# Patient Record
Sex: MALE | Race: WHITE | NOT HISPANIC OR LATINO | Employment: FULL TIME | URBAN - METROPOLITAN AREA
[De-identification: names, ages, dates, MRNs, and addresses within clinical notes are randomized per-mention and may not be internally consistent; named-entity substitution may affect disease eponyms.]

---

## 2023-12-05 ENCOUNTER — OFFICE VISIT (OUTPATIENT)
Dept: GASTROENTEROLOGY | Facility: CLINIC | Age: 43
End: 2023-12-05
Payer: COMMERCIAL

## 2023-12-05 ENCOUNTER — TELEPHONE (OUTPATIENT)
Dept: GASTROENTEROLOGY | Facility: CLINIC | Age: 43
End: 2023-12-05

## 2023-12-05 ENCOUNTER — TELEPHONE (OUTPATIENT)
Dept: GASTROENTEROLOGY | Facility: CLINIC | Age: 43
End: 2023-12-05
Payer: COMMERCIAL

## 2023-12-05 VITALS
SYSTOLIC BLOOD PRESSURE: 124 MMHG | DIASTOLIC BLOOD PRESSURE: 82 MMHG | HEART RATE: 67 BPM | WEIGHT: 161 LBS | TEMPERATURE: 96.8 F | HEIGHT: 68 IN | BODY MASS INDEX: 24.4 KG/M2

## 2023-12-05 DIAGNOSIS — R10.13 EPIGASTRIC PAIN: Primary | ICD-10-CM

## 2023-12-05 DIAGNOSIS — K21.9 GASTROESOPHAGEAL REFLUX DISEASE, UNSPECIFIED WHETHER ESOPHAGITIS PRESENT: ICD-10-CM

## 2023-12-05 DIAGNOSIS — R10.11 RIGHT UPPER QUADRANT ABDOMINAL PAIN: ICD-10-CM

## 2023-12-05 PROCEDURE — 99203 OFFICE O/P NEW LOW 30 MIN: CPT | Performed by: INTERNAL MEDICINE

## 2023-12-05 RX ORDER — PROPRANOLOL HYDROCHLORIDE 10 MG/1
10 TABLET ORAL EVERY EVENING
COMMUNITY
Start: 2023-09-07

## 2023-12-05 RX ORDER — OMEPRAZOLE 20 MG/1
20 CAPSULE, DELAYED RELEASE ORAL
COMMUNITY
Start: 2023-11-20 | End: 2023-12-05 | Stop reason: ALTCHOICE

## 2023-12-05 RX ORDER — PANTOPRAZOLE SODIUM 40 MG/1
40 TABLET, DELAYED RELEASE ORAL DAILY
Qty: 90 TABLET | Refills: 3 | Status: SHIPPED | OUTPATIENT
Start: 2023-12-05

## 2023-12-05 NOTE — TELEPHONE ENCOUNTER
SANTANA BARAHONA PT SCHEDULED 01/17/2024 @1030.D PEREJACQUI PACKET HANDED TO [facility] FOR HUB TO READ

## 2023-12-05 NOTE — PROGRESS NOTES
Chief Complaint   Patient presents with   • Heartburn     Shayne Bess is a 43 y.o. male who presents with a history of of new onset epigastric pain right upper quadrant  HPI    Patient is a 43-year-old male with history of GERD presenting with epigastric and right upper quadrant pain acutely in early November.  Patient seen in emergency room with CT and labs performed all within normal limits presenting now for further recommendations.  Patient is alternated in the past between Nexium and omeprazole for his heartburn which is improved but still gets breakthrough.  Patient thinks the omeprazole is somewhat less effective than the Nexium.  Patient was actually on the omeprazole when the acute exacerbation occurred.  Patient here for further recommendations    Past Medical History:   Diagnosis Date   • GERD (gastroesophageal reflux disease) 01/2010    Intermittent heartburn managed by occassional PPI       Current Outpatient Medications:   •  propranolol (INDERAL) 10 MG tablet, Take 1 tablet by mouth Every Evening., Disp: , Rfl:   •  pantoprazole (PROTONIX) 40 MG EC tablet, Take 1 tablet by mouth Daily., Disp: 90 tablet, Rfl: 3  No Known Allergies  Social History     Socioeconomic History   • Marital status:    Tobacco Use   • Smoking status: Never   Substance and Sexual Activity   • Alcohol use: Yes     Alcohol/week: 5.0 standard drinks of alcohol     Types: 2 Cans of beer, 3 Shots of liquor per week     Comment: Currently not drinking alcohol, previous 3-5 days 1 serving   • Drug use: Never   • Sexual activity: Yes     Partners: Female     Birth control/protection: Natural family planning/Rhythm     History reviewed. No pertinent family history.  Review of Systems   Constitutional: Negative.    HENT: Negative.     Eyes: Negative.    Respiratory: Negative.     Cardiovascular: Negative.    Gastrointestinal:  Positive for abdominal pain. Negative for abdominal distention, anal bleeding, blood in stool,  constipation, diarrhea, nausea, rectal pain and vomiting.   Endocrine: Negative.    Musculoskeletal: Negative.    Skin: Negative.    Allergic/Immunologic: Negative.    Hematological: Negative.    Vitals:    12/05/23 1125   BP: 124/82   Pulse: 67   Temp: 96.8 °F (36 °C)     Physical Exam  Vitals and nursing note reviewed.   Constitutional:       Appearance: Normal appearance. He is well-developed and normal weight.   HENT:      Head: Normocephalic and atraumatic.   Eyes:      General: No scleral icterus.     Conjunctiva/sclera: Conjunctivae normal.   Neck:      Trachea: No tracheal deviation.   Cardiovascular:      Rate and Rhythm: Normal rate and regular rhythm.      Heart sounds: Normal heart sounds. No murmur heard.     No friction rub. No gallop.   Pulmonary:      Effort: Pulmonary effort is normal. No respiratory distress.      Breath sounds: Normal breath sounds.   Abdominal:      General: Bowel sounds are normal. There is no distension.      Palpations: Abdomen is soft. There is no mass.      Tenderness: There is no abdominal tenderness. There is no guarding or rebound.   Musculoskeletal:         General: No swelling or tenderness. Normal range of motion.      Cervical back: Normal range of motion and neck supple. No rigidity.   Skin:     General: Skin is warm and dry.      Coloration: Skin is not jaundiced.      Findings: No rash.   Neurological:      General: No focal deficit present.      Mental Status: He is alert and oriented to person, place, and time.      Cranial Nerves: No cranial nerve deficit.   Psychiatric:         Behavior: Behavior normal.         Thought Content: Thought content normal.         Judgment: Judgment normal.   Diagnoses and all orders for this visit:    Epigastric pain  -     US Liver; Future  -     Case Request; Standing  -     Implement Anesthesia orders day of procedure.; Standing  -     Obtain informed consent; Standing  -     Case Request    Right upper quadrant abdominal  pain  -     US Liver; Future  -     Case Request; Standing  -     Implement Anesthesia orders day of procedure.; Standing  -     Obtain informed consent; Standing  -     Case Request    Gastroesophageal reflux disease, unspecified whether esophagitis present  -     Case Request; Standing  -     Implement Anesthesia orders day of procedure.; Standing  -     Obtain informed consent; Standing  -     Case Request    Other orders  -     Discontinue: omeprazole (priLOSEC) 20 MG capsule; Take 1 capsule by mouth.  -     propranolol (INDERAL) 10 MG tablet; Take 1 tablet by mouth Every Evening.  -     pantoprazole (PROTONIX) 40 MG EC tablet; Take 1 tablet by mouth Daily.    Patient is a 43-year-old male with history of GERD heartburn taking intermittent Nexium or omeprazole with severe exacerbation noted early November.  Patient with severe epigastric and right upper quadrant pain radiating to the chest not responsive to his usual medicines.  Patient seen in the ER normal labs and normal CT referred now for further recommendations.  Patient without any hematemesis or melena.  CT with no gallbladder inflammation or CT changes of the GI tract.  For now patient does associate symptoms with fatty foods we will arrange for right upper quadrant ultrasound to rule out noncalcified stones but also arrange EGD due to history of reflux and symptoms similar to his reflux pain just more severe with this episode.  Will also try to change his PPI to see if we get better response from an alternative.

## 2023-12-05 NOTE — TELEPHONE ENCOUNTER
Returned patient's phone call. Advised he is to continue on pantoprazole and when he has healed more then he will decrease the pantoprazole and start Pepcid. He verb understanding.

## 2023-12-05 NOTE — TELEPHONE ENCOUNTER
Hub staff attempted to follow warm transfer process and was unsuccessful     Caller: Shayne Bess    Relationship to patient: Self    Best call back number: [phone] (Mobile)    Patient is needing: PT SAID AT TODAYS VISIT WITH DR NAJERA HE WAS TOLD HE WOULD BE PUT ON PEPCID, BUT IT LOOKS LIKE PANTOPRAZOLE WAS SENT TO HIS PHARMACY. HE WOULD LIKE A NURSE TO CALL HIM ASAP.

## 2023-12-18 ENCOUNTER — HOSPITAL ENCOUNTER (OUTPATIENT)
Dept: ULTRASOUND IMAGING | Facility: HOSPITAL | Age: 43
Discharge: HOME | End: 2023-12-18
Admitting: INTERNAL MEDICINE
Payer: COMMERCIAL

## 2023-12-18 DIAGNOSIS — R10.11 RIGHT UPPER QUADRANT ABDOMINAL PAIN: ICD-10-CM

## 2023-12-18 DIAGNOSIS — R10.13 EPIGASTRIC PAIN: ICD-10-CM

## 2023-12-18 PROCEDURE — 76705 ECHO EXAM OF ABDOMEN: CPT

## 2023-12-22 ENCOUNTER — TELEPHONE (OUTPATIENT)
Dept: GASTROENTEROLOGY | Facility: CLINIC | Age: 43
End: 2023-12-22
Payer: COMMERCIAL

## 2023-12-22 NOTE — TELEPHONE ENCOUNTER
Call to patient per Dr. Hedrick's results and recommendations.   Patient verbalized understanding     Symptoms have gotten better and the burning has subsided significantly. Patient wants to know when he can start having coffee and adding things back in his diet.     ----- Message from Aman Hedrick MD sent at 12/22/2023  8:40 AM EST -----  Ultrasound was normal, check on patient's symptoms.

## 2023-12-26 NOTE — TELEPHONE ENCOUNTER
Aman Hedrick MD  [facility] Clinical 2 Pool  Caller: Unspecified (4 days ago, 10:31 AM)  Okay to try and 1 thing back at a time, stay on the pantoprazole and will follow clinical response

## 2024-01-10 ENCOUNTER — TELEPHONE (OUTPATIENT)
Dept: GASTROENTEROLOGY | Facility: CLINIC | Age: 44
End: 2024-01-10
Payer: COMMERCIAL

## 2024-01-17 ENCOUNTER — ANESTHESIA (OUTPATIENT)
Dept: GASTROENTEROLOGY | Facility: HOSPITAL | Age: 44
End: 2024-01-17
Payer: COMMERCIAL

## 2024-01-17 ENCOUNTER — ANESTHESIA EVENT (OUTPATIENT)
Dept: GASTROENTEROLOGY | Facility: HOSPITAL | Age: 44
End: 2024-01-17
Payer: COMMERCIAL

## 2024-01-17 ENCOUNTER — HOSPITAL ENCOUNTER (OUTPATIENT)
Facility: HOSPITAL | Age: 44
Setting detail: HOSPITAL OUTPATIENT SURGERY
Discharge: HOME | End: 2024-01-17
Attending: INTERNAL MEDICINE | Admitting: INTERNAL MEDICINE
Payer: COMMERCIAL

## 2024-01-17 VITALS
HEIGHT: 68 IN | SYSTOLIC BLOOD PRESSURE: 134 MMHG | DIASTOLIC BLOOD PRESSURE: 83 MMHG | BODY MASS INDEX: 25.31 KG/M2 | WEIGHT: 167 LBS | HEART RATE: 68 BPM | OXYGEN SATURATION: 99 % | RESPIRATION RATE: 18 BRPM

## 2024-01-17 DIAGNOSIS — R10.11 RIGHT UPPER QUADRANT ABDOMINAL PAIN: ICD-10-CM

## 2024-01-17 DIAGNOSIS — K21.9 GASTROESOPHAGEAL REFLUX DISEASE, UNSPECIFIED WHETHER ESOPHAGITIS PRESENT: ICD-10-CM

## 2024-01-17 DIAGNOSIS — R10.13 EPIGASTRIC PAIN: ICD-10-CM

## 2024-01-17 PROCEDURE — 25010000002 PROPOFOL 10 MG/ML EMULSION: Performed by: REGISTERED NURSE

## 2024-01-17 PROCEDURE — 43239 EGD BIOPSY SINGLE/MULTIPLE: CPT | Performed by: INTERNAL MEDICINE

## 2024-01-17 PROCEDURE — 25810000003 LACTATED RINGERS PER 1000 ML: Performed by: INTERNAL MEDICINE

## 2024-01-17 PROCEDURE — S0260 H&P FOR SURGERY: HCPCS | Performed by: INTERNAL MEDICINE

## 2024-01-17 PROCEDURE — 88305 TISSUE EXAM BY PATHOLOGIST: CPT | Performed by: INTERNAL MEDICINE

## 2024-01-17 RX ORDER — LIDOCAINE HYDROCHLORIDE 20 MG/ML
INJECTION, SOLUTION INFILTRATION; PERINEURAL AS NEEDED
Status: DISCONTINUED | OUTPATIENT
Start: 2024-01-17 | End: 2024-01-17 | Stop reason: SURG

## 2024-01-17 RX ORDER — PROPOFOL 10 MG/ML
VIAL (ML) INTRAVENOUS AS NEEDED
Status: DISCONTINUED | OUTPATIENT
Start: 2024-01-17 | End: 2024-01-17 | Stop reason: SURG

## 2024-01-17 RX ORDER — SODIUM CHLORIDE 0.9 % (FLUSH) 0.9 %
10 SYRINGE (ML) INJECTION EVERY 12 HOURS SCHEDULED
Status: DISCONTINUED | OUTPATIENT
Start: 2024-01-17 | End: 2024-01-17 | Stop reason: HOSPADM

## 2024-01-17 RX ORDER — SODIUM CHLORIDE, SODIUM LACTATE, POTASSIUM CHLORIDE, CALCIUM CHLORIDE 600; 310; 30; 20 MG/100ML; MG/100ML; MG/100ML; MG/100ML
30 INJECTION, SOLUTION INTRAVENOUS CONTINUOUS PRN
Status: DISCONTINUED | OUTPATIENT
Start: 2024-01-17 | End: 2024-01-17 | Stop reason: HOSPADM

## 2024-01-17 RX ADMIN — PROPOFOL 80 MG: 10 INJECTION, EMULSION INTRAVENOUS at 13:21

## 2024-01-17 RX ADMIN — SODIUM CHLORIDE, POTASSIUM CHLORIDE, SODIUM LACTATE AND CALCIUM CHLORIDE 30 ML/HR: 600; 310; 30; 20 INJECTION, SOLUTION INTRAVENOUS at 12:17

## 2024-01-17 RX ADMIN — PROPOFOL 180 MCG/KG/MIN: 10 INJECTION, EMULSION INTRAVENOUS at 13:21

## 2024-01-17 RX ADMIN — LIDOCAINE HYDROCHLORIDE 60 MG: 20 INJECTION, SOLUTION INFILTRATION; PERINEURAL at 13:21

## 2024-01-17 NOTE — BRIEF OP NOTE
ESOPHAGOGASTRODUODENOSCOPY  Progress Note    Shayne Bess  1/17/2024    Pre-op Diagnosis:   Epigastric pain [R10.13]  Right upper quadrant abdominal pain [R10.11]  Gastroesophageal reflux disease, unspecified whether esophagitis present [K21.9]       Post-Op Diagnosis Codes:     * Epigastric pain [R10.13]     * Right upper quadrant abdominal pain [R10.11]     * Gastroesophageal reflux disease, unspecified whether esophagitis present [K21.9]     * Hiatal hernia [K44.9]     * Gastritis [K29.70]    Procedure/CPT® Codes:        Procedure(s):  ESOPHAGOGASTRODUODENOSCOPY with bx              Surgeon(s):  Aman Hedrick MD    Anesthesia: Monitored Anesthesia Care    Staff:   Endo Technician: Angelo Maldonado  Endo Nurse: Sydney Rincon RN         Estimated Blood Loss: minimal    Urine Voided: * No values recorded between 1/17/2024  1:11 PM and 1/17/2024  1:33 PM *    Specimens:                Specimens       ID Source Type Tests Collected By Collected At Frozen?    A Stomach Tissue TISSUE PATHOLOGY EXAM   Aman Hedrick MD 1/17/24 1331 No    Description: gastric bx    This specimen was not marked as sent.                  Drains: * No LDAs found *    Findings: EGD with biopsy performed showing normal duodenal mucosa throughout.  Antral gastritis biopsies taken.  Retroflexed view the GE junction did show small sliding hiatal hernia with normal esophageal mucosa throughout.        Complications: None          Aman Hedrick MD     Date: 1/17/2024  Time: 13:33 EST

## 2024-01-17 NOTE — H&P
"[facility], [place], operated by [facility]  Pre Procedure History & Physical    Chief Complaint:   Abdominal pain and reflux    Subjective     HPI:   Patient 43-year-old male with history of GERD presenting with epigastric pain here for EGD.    Past Medical History:   Past Medical History:   Diagnosis Date    GERD (gastroesophageal reflux disease) 01/2010    Intermittent heartburn managed by occassional PPI    Palpitations     DUE TO ANXIETY       Past Surgical History:  Past Surgical History:   Procedure Laterality Date    RHINOPLASTY         Family History:  Family History   Problem Relation Age of Onset    Malig Hyperthermia Neg Hx        Social History:   reports that he has never smoked. He does not have any smokeless tobacco history on file. He reports current alcohol use of about 5.0 standard drinks of alcohol per week. He reports that he does not use drugs.    Medications:   Medications Prior to Admission   Medication Sig Dispense Refill Last Dose    pantoprazole (PROTONIX) 40 MG EC tablet Take 1 tablet by mouth Daily. 90 tablet 3 1/16/2024    propranolol (INDERAL) 10 MG tablet Take 1 tablet by mouth At Night As Needed.   Past Week       Allergies:  Patient has no known allergies.    ROS:    Pertinent items are noted in HPI     Objective     Blood pressure 144/85, pulse 68, resp. rate 16, height 172.7 cm (68\"), weight 75.8 kg (167 lb), SpO2 99%.    Physical Exam   Constitutional: Pt is oriented to person, place, and time and well-developed, well-nourished, and in no distress.   Mouth/Throat: Oropharynx is clear and moist.   Neck: Normal range of motion.   Cardiovascular: Normal rate, regular rhythm and normal heart sounds.    Pulmonary/Chest: Effort normal and breath sounds normal.   Abdominal: Soft. Nontender  Skin: Skin is warm and dry.   Psychiatric: Mood, memory, affect and judgment normal.     Assessment & Plan     Diagnosis:  GERD  Epigastric pain    Anticipated Surgical Procedure:  EGD    The risks, " benefits, and alternatives of this procedure have been discussed with the patient or the responsible party- the patient understands and agrees to proceed.

## 2024-01-17 NOTE — ANESTHESIA PREPROCEDURE EVALUATION
Anesthesia Evaluation     Patient summary reviewed and Nursing notes reviewed                Airway   Mallampati: III  TM distance: <3 FB  Neck ROM: full  Narrow palate  Dental      Pulmonary - negative pulmonary ROS   Cardiovascular - negative cardio ROS    Rhythm: regular  Rate: normal        Neuro/Psych- negative ROS  GI/Hepatic/Renal/Endo    (+) GERD    Musculoskeletal (-) negative ROS    Abdominal    Substance History   (+) alcohol use     OB/GYN negative ob/gyn ROS         Other                    Anesthesia Plan    ASA 2     MAC     intravenous induction     Anesthetic plan, risks, benefits, and alternatives have been provided, discussed and informed consent has been obtained with: patient.    Plan discussed with CRNA.    CODE STATUS:

## 2024-01-17 NOTE — DISCHARGE INSTRUCTIONS
For the next 24 hours patient needs to be with a responsible adult.    For 24 hours DO NOT drive, operate machinery, appliances, drink alcohol, make important decisions or sign legal documents.    Start with a light or bland diet if you are feeling sick to your stomach otherwise advance to regular diet as tolerated.    Follow recommendations on procedure report if provided by your doctor.    Call Dr Hedrick for problems [phone]    Problems may include but not limited to: large amounts of bleeding, trouble breathing, repeated vomiting, severe unrelieved pain, fever or chills.

## 2024-01-17 NOTE — ANESTHESIA POSTPROCEDURE EVALUATION
Patient: Shayne Bess    Procedure Summary       Date: 01/17/24 Room / Location:  [facility] /  [facility]    Anesthesia Start: 1311 Anesthesia Stop: 1336    Procedure: ESOPHAGOGASTRODUODENOSCOPY with bx (Esophagus) Diagnosis:       Epigastric pain      Right upper quadrant abdominal pain      Gastroesophageal reflux disease, unspecified whether esophagitis present      Hiatal hernia      Gastritis      (Epigastric pain [R10.13])      (Right upper quadrant abdominal pain [R10.11])      (Gastroesophageal reflux disease, unspecified whether esophagitis present [K21.9])    Surgeons: Aman Hedrick MD Provider: Tawanda Cody MD    Anesthesia Type: MAC ASA Status: 2            Anesthesia Type: MAC    Vitals  Vitals Value Taken Time   /83 01/17/24 1356   Temp     Pulse 69 01/17/24 1401   Resp 18 01/17/24 1355   SpO2 99 % 01/17/24 1401   Vitals shown include unfiled device data.        Post Anesthesia Care and Evaluation    Patient location during evaluation: PACU  Patient participation: complete - patient participated  Level of consciousness: awake and alert  Pain management: adequate    Airway patency: patent  Anesthetic complications: No anesthetic complications    Cardiovascular status: acceptable  Respiratory status: acceptable  Hydration status: acceptable    Comments: --------------------            01/17/24               1355     --------------------   BP:       134/83     Pulse:               Resp:       18       SpO2:               --------------------VSS

## 2024-01-18 LAB
LAB AP CASE REPORT: NORMAL
PATH REPORT.FINAL DX SPEC: NORMAL
PATH REPORT.GROSS SPEC: NORMAL

## 2024-01-31 ENCOUNTER — TELEPHONE (OUTPATIENT)
Dept: GASTROENTEROLOGY | Facility: CLINIC | Age: 44
End: 2024-01-31
Payer: COMMERCIAL

## 2024-01-31 NOTE — TELEPHONE ENCOUNTER
Patient called. Advised as per Dr. Hedrick's note. He verb understanding.  He states he is feeling better.   Follow up scheduled with Gayle on 3/29@0845.   Patient is wanting to have a conversation to decrease his Pantoprazole.

## 2024-01-31 NOTE — TELEPHONE ENCOUNTER
----- Message from Aman Hedrick MD sent at 1/31/2024  1:13 PM EST -----  Mild gastritis seen but no H. pylori.  Check on patient's symptoms

## 2024-03-20 ENCOUNTER — OFFICE VISIT (OUTPATIENT)
Dept: GASTROENTEROLOGY | Facility: CLINIC | Age: 44
End: 2024-03-20
Payer: COMMERCIAL

## 2024-03-20 VITALS
DIASTOLIC BLOOD PRESSURE: 85 MMHG | HEIGHT: 68 IN | HEART RATE: 76 BPM | WEIGHT: 173.2 LBS | TEMPERATURE: 97.1 F | BODY MASS INDEX: 26.25 KG/M2 | SYSTOLIC BLOOD PRESSURE: 130 MMHG

## 2024-03-20 DIAGNOSIS — K21.9 GASTROESOPHAGEAL REFLUX DISEASE, UNSPECIFIED WHETHER ESOPHAGITIS PRESENT: Primary | ICD-10-CM

## 2024-03-20 PROCEDURE — 99213 OFFICE O/P EST LOW 20 MIN: CPT | Performed by: NURSE PRACTITIONER

## 2024-03-20 RX ORDER — PANTOPRAZOLE SODIUM 20 MG/1
20 TABLET, DELAYED RELEASE ORAL DAILY
Qty: 90 TABLET | Refills: 3 | Status: SHIPPED | OUTPATIENT
Start: 2024-03-20

## 2024-03-20 NOTE — PROGRESS NOTES
Chief Complaint   Patient presents with    Abdominal Pain       HPI    Shayne Bess is a  43 y.o. male here with a history of anxiety for a follow up visit for abdominal pain endoscopic follow-up.    This patient follows with Dr. Hedrick, new to me.    EGD dated 1/2024 - gastritis and small hiatal hernia.  Pathology was benign.    On visit today reports feeling much improved.  He has had complete resolution of abdominal pain and dyspepsia.  No nausea or vomiting.  Appetite is good.  His weight is stable.  He is following an antireflux diet for the most part.  He is interested in tapering down pantoprazole which he is taking daily at 40 mg.    No lower GI complaints.    Past Medical History:   Diagnosis Date    GERD (gastroesophageal reflux disease) 01/2010    Intermittent heartburn managed by occassional PPI    Palpitations     DUE TO ANXIETY       Past Surgical History:   Procedure Laterality Date    ENDOSCOPY N/A 01/17/2024    Procedure: ESOPHAGOGASTRODUODENOSCOPY with bx;  Surgeon: Aman Hedrick MD;  Location: [facility] ENDOSCOPY;  Service: Gastroenterology;  Laterality: N/A;  pre: epigastric pain  post: hiatal hernia, gastritis    RHINOPLASTY      UPPER GASTROINTESTINAL ENDOSCOPY  01/17/24       Scheduled Meds:     Continuous Infusions: No current facility-administered medications for this visit.      PRN Meds:     No Known Allergies    Social History     Socioeconomic History    Marital status:    Tobacco Use    Smoking status: Never   Vaping Use    Vaping status: Never Used   Substance and Sexual Activity    Alcohol use: Not Currently     Alcohol/week: 5.0 standard drinks of alcohol     Comment: Currently not drinking alcohol, previous 3-5 days 1 serving    Drug use: Never    Sexual activity: Yes     Partners: Female     Birth control/protection: Natural family planning/Rhythm       Family History   Problem Relation Age of Onset    Malig Hyperthermia Neg Hx        Review of Systems   HENT:   Negative for trouble swallowing.    Gastrointestinal: Negative.        Vitals:    03/20/24 1335   BP: 130/85   Pulse: 76   Temp: 97.1 °F (36.2 °C)       Physical Exam  Constitutional:       Appearance: He is well-developed.   Abdominal:      General: Bowel sounds are normal. There is no distension.      Palpations: Abdomen is soft. There is no mass.      Tenderness: There is no abdominal tenderness. There is no guarding.      Hernia: No hernia is present.   Skin:     General: Skin is warm and dry.      Capillary Refill: Capillary refill takes less than 2 seconds.   Neurological:      Mental Status: He is alert and oriented to person, place, and time.   Psychiatric:         Behavior: Behavior normal.     Assessment    Diagnoses and all orders for this visit:    1. Gastroesophageal reflux disease, unspecified whether esophagitis present (Primary)    Other orders  -     pantoprazole (Protonix) 20 MG EC tablet; Take 1 tablet by mouth Daily.  Dispense: 90 tablet; Refill: 3    Plan    Stable GERD, continue PPI therapy but discussed decreasing to 20 mg a day and monitoring for any symptoms  Continue antireflux dietary precautions  Follow-up 1 year or sooner if needed  Colonoscopy for surveillance purposes age 47         TRISTAN Jerome  [facility]  [address] [place] [address]  Office: [phone]

## 2024-09-05 ENCOUNTER — TELEPHONE (OUTPATIENT)
Dept: GASTROENTEROLOGY | Facility: CLINIC | Age: 44
End: 2024-09-05
Payer: COMMERCIAL

## 2024-09-05 NOTE — TELEPHONE ENCOUNTER
----- Message from [facility] [employer] sent at 9/5/2024  8:19 AM EDT -----  Regarding: Appointment Request  Contact: [phone]  Appointment Request From: Shayne Bess    With Provider: Gayle Camacho [[facility]]    Preferred Date Range: 9/9/2024 – 9/20/2024    Preferred Times: Any Time    Reason for visit: Follow-up    Comments:  Followup, symptoms have returned.

## 2024-09-10 ENCOUNTER — OFFICE VISIT (OUTPATIENT)
Dept: GASTROENTEROLOGY | Facility: CLINIC | Age: 44
End: 2024-09-10
Payer: COMMERCIAL

## 2024-09-10 VITALS
SYSTOLIC BLOOD PRESSURE: 124 MMHG | DIASTOLIC BLOOD PRESSURE: 82 MMHG | TEMPERATURE: 97.7 F | BODY MASS INDEX: 23.91 KG/M2 | HEIGHT: 70 IN | HEART RATE: 67 BPM | WEIGHT: 167 LBS

## 2024-09-10 DIAGNOSIS — R10.33 PERIUMBILICAL PAIN: Primary | ICD-10-CM

## 2024-09-10 PROCEDURE — 99213 OFFICE O/P EST LOW 20 MIN: CPT | Performed by: NURSE PRACTITIONER

## 2024-09-10 NOTE — PROGRESS NOTES
Chief Complaint   Patient presents with    Abdominal Pain       HPI    Shayne Bess is a  44 y.o. male here for a follow up visit for abdominal pain.    This patient was last seen by Dr. Hedrick winter 2024, he is known to me.    Patient reports episodic abdominal pain that localizes to the mid abdominal area/periumbilical area and can distribute like a band across the mid upper abdomen.  He had been doing well in fact he has been able to reduce PPI therapy down to Pepcid but with return of symptoms he resumed Protonix at 40 mg once daily x 1 week now on 20 mg once daily.  No dietary triggers for symptoms.  He feels better on visit today.  No nausea, vomiting, diarrhea or constipation.  No rectal bleeding.    EGD 1/2024 - gastritis and small hiatal hernia.  Pathology was benign.     Right upper quadrant ultrasound 12/2023 - normal.    CT A/P with contrast 11/2023 - Appendicolith without evidence of acute appendicitis.     Past Medical History:   Diagnosis Date    GERD (gastroesophageal reflux disease) 01/2010    Intermittent heartburn managed by occassional PPI    Palpitations     DUE TO ANXIETY       Past Surgical History:   Procedure Laterality Date    ENDOSCOPY N/A 01/17/2024    Procedure: ESOPHAGOGASTRODUODENOSCOPY with bx;  Surgeon: Aman Hedrick MD;  Location: [facility] ENDOSCOPY;  Service: Gastroenterology;  Laterality: N/A;  pre: epigastric pain  post: hiatal hernia, gastritis    RHINOPLASTY      UPPER GASTROINTESTINAL ENDOSCOPY  01/17/24       Scheduled Meds:     Continuous Infusions: No current facility-administered medications for this visit.      PRN Meds:     No Known Allergies    Social History     Socioeconomic History    Marital status:    Tobacco Use    Smoking status: Never   Vaping Use    Vaping status: Never Used   Substance and Sexual Activity    Alcohol use: Not Currently     Alcohol/week: 5.0 standard drinks of alcohol     Comment: Currently not drinking alcohol, previous 3-5 days  1 serving    Drug use: Never    Sexual activity: Yes     Partners: Female     Birth control/protection: Natural family planning/Rhythm       Family History   Problem Relation Age of Onset    Malig Hyperthermia Neg Hx        Review of Systems   Gastrointestinal:  Positive for abdominal pain.       Vitals:    09/10/24 1427   BP: 124/82   Pulse: 67   Temp: 97.7 °F (36.5 °C)       Physical Exam  Constitutional:       Appearance: He is well-developed.   Abdominal:      General: Bowel sounds are normal. There is no distension.      Palpations: Abdomen is soft. There is no mass.      Tenderness: There is abdominal tenderness. There is no guarding.      Hernia: No hernia is present.   Skin:     General: Skin is warm and dry.      Capillary Refill: Capillary refill takes less than 2 seconds.   Neurological:      Mental Status: He is alert and oriented to person, place, and time.   Psychiatric:         Behavior: Behavior normal.     Assessment    Diagnoses and all orders for this visit:    1. Periumbilical pain (Primary)  -     CBC (No Diff)  -     Comprehensive Metabolic Panel    Plan    Continue PPI therapy  [place] diet x 1 week  CBC and CMP  Check breath testing for SIBO  Offered upper GI series with small bowel follow-through versus gastric emptying study and patient prefers to wait and see how he feels in 4 weeks time  Encouraged him to call if symptoms worsen or fail to improve and we can move forward with imaging         TRISTAN Jerome  [facility]  [address] [place] [address] - [address] [place] [address]  Office: [phone]

## 2024-09-11 ENCOUNTER — TELEPHONE (OUTPATIENT)
Dept: GASTROENTEROLOGY | Facility: CLINIC | Age: 44
End: 2024-09-11
Payer: COMMERCIAL

## 2024-09-11 NOTE — TELEPHONE ENCOUNTER
----- Message from Gayle Camacho sent at 9/11/2024 10:42 AM EDT -----  Please mail a SIBO breath test kit to his home

## 2024-10-29 ENCOUNTER — TELEPHONE (OUTPATIENT)
Dept: GASTROENTEROLOGY | Facility: CLINIC | Age: 44
End: 2024-10-29
Payer: COMMERCIAL

## 2024-10-29 NOTE — TELEPHONE ENCOUNTER
Spoke with patient and patient understood that he had some over due labs and he stated that he will get them done with his PCP at his appointment coming up.

## (undated) DEVICE — CANN O2 ETCO2 FITS ALL CONN CO2 SMPL A/ 7IN DISP LF

## (undated) DEVICE — KT ORCA ORCAPOD DISP STRL

## (undated) DEVICE — TUBING, SUCTION, 1/4" X 10', STRAIGHT: Brand: MEDLINE

## (undated) DEVICE — BLCK/BITE BLOX W/DENTL/RIM W/STRAP 54F

## (undated) DEVICE — SENSR O2 OXIMAX FNGR A/ 18IN NONSTR

## (undated) DEVICE — ADAPT CLN BIOGUARD AIR/H2O DISP

## (undated) DEVICE — LN SMPL CO2 SHTRM SD STREAM W/M LUER

## (undated) DEVICE — FRCP BX RADJAW4 NDL 2.8 240CM LG OG BX40